# Patient Record
Sex: MALE | Race: WHITE | Employment: OTHER | URBAN - METROPOLITAN AREA
[De-identification: names, ages, dates, MRNs, and addresses within clinical notes are randomized per-mention and may not be internally consistent; named-entity substitution may affect disease eponyms.]

---

## 2019-09-27 PROBLEM — R09.81 NASAL CONGESTION: Status: ACTIVE | Noted: 2019-09-27

## 2020-09-23 ENCOUNTER — VIRTUAL VISIT (OUTPATIENT)
Dept: GERIATRIC MEDICINE | Age: 80
End: 2020-09-23
Payer: MEDICARE

## 2020-09-23 PROCEDURE — 99212 OFFICE O/P EST SF 10 MIN: CPT | Performed by: INTERNAL MEDICINE

## 2020-09-23 PROCEDURE — 4040F PNEUMOC VAC/ADMIN/RCVD: CPT | Performed by: INTERNAL MEDICINE

## 2020-09-23 PROCEDURE — 1036F TOBACCO NON-USER: CPT | Performed by: INTERNAL MEDICINE

## 2020-09-23 PROCEDURE — G8427 DOCREV CUR MEDS BY ELIG CLIN: HCPCS | Performed by: INTERNAL MEDICINE

## 2020-09-23 PROCEDURE — 1123F ACP DISCUSS/DSCN MKR DOCD: CPT | Performed by: INTERNAL MEDICINE

## 2020-09-23 PROCEDURE — G8417 CALC BMI ABV UP PARAM F/U: HCPCS | Performed by: INTERNAL MEDICINE

## 2020-09-23 RX ORDER — AMPICILLIN 500 MG/1
500 CAPSULE ORAL 4 TIMES DAILY
Qty: 40 CAPSULE | Refills: 0 | Status: SHIPPED | OUTPATIENT
Start: 2020-09-23 | End: 2020-10-03

## 2020-09-23 NOTE — PROGRESS NOTES
TeleMedicine Video Visit    This visit was performed as a virtual video visit using a synchronous, two-way, audio-video telehealth technology platform. Patient identification was verified at the start of the visit, including the patient's telephone number and physical location. I discussed with the patient the nature of our telehealth visits, that:     1. Due to the nature of an audio- video modality, the only components of a physical exam that could be done are the elements supported by direct observation. 2. I would evaluate the patient and recommend diagnostics and treatments based on my assessment. 3. If it was felt that the patient should be evaluated in clinic or an emergency room setting, then they would be directed there. 4. Our sessions are not being recorded and that personal health information is protected. 5. Our team would provide follow up care in person if/when the patient needs it. Patient does agree to proceed with telemedicine consultation. Patient's location: home address in [facility]  Physician  location home address in [facility] other people involved in call Sharlaveronica Marlene      Time spent: Greater than 20    This visit was completed virtually using Doxy. me    Video conference at 3PM    70year old alfonso with Trisomy 21 and Alzheimer's disease on Aricept 10 mg  and Namenda 20 mg a day  Out of workshop x 6 months  Will be playing [employer]  Very confused with eye infection  No COVID  No temp   Eating well  Lost interest in chicken new problem  Going back to [facility] twice a week  Still interest in [employer] manly type   Picking more his  skin   Spoke with [employer] [facility] with walker with unsteadiness  Needs handicap placard   Needs Ampicillin 500 mg tid prn   Kunal Shackle his niece  And he recognizes her  He meds and allergies were reviewed  Living in [place] on [facility]  Trisomy 21 with Alzheimers                       Delirium with infection   Plan  Same meds for now           Imelda Halsted the oldest person in the WORLD            With Trisomy 21  last year at 80years old. Remonia Clarity will be 68years old soon and  may be the present champion or runner up, tamar checked out.

## 2020-11-03 PROBLEM — I10 HYPERTENSION: Status: RESOLVED | Noted: 2020-11-03 | Resolved: 2020-11-03

## 2020-11-18 ENCOUNTER — TELEPHONE (OUTPATIENT)
Dept: GERIATRIC MEDICINE | Age: 80
End: 2020-11-18

## 2020-11-18 RX ORDER — DONEPEZIL HYDROCHLORIDE 5 MG/1
5 TABLET, FILM COATED ORAL
Qty: 90 TABLET | Refills: 3 | Status: SHIPPED
Start: 2020-11-18 | End: 2022-02-09

## 2020-11-18 NOTE — TELEPHONE ENCOUNTER
Pt caregiver called states that she thinks that pt meds need increased states that he is putting his clothes on backwards, very forgetful and states that he is going back to his old room.  Please call her

## 2020-11-18 NOTE — TELEPHONE ENCOUNTER
Spoke with caregiver and will increase Aricept to 15 mg a day and Namenda to remain at 10 mg a day  Escripted to [employer]

## 2021-04-28 ENCOUNTER — HOSPITAL ENCOUNTER (OUTPATIENT)
Dept: WOUND CARE | Age: 81
Discharge: HOME | End: 2021-04-28
Payer: MEDICARE

## 2021-04-28 VITALS
RESPIRATION RATE: 18 BRPM | DIASTOLIC BLOOD PRESSURE: 58 MMHG | TEMPERATURE: 99.1 F | HEIGHT: 61 IN | BODY MASS INDEX: 29.64 KG/M2 | SYSTOLIC BLOOD PRESSURE: 102 MMHG | WEIGHT: 157 LBS | HEART RATE: 88 BPM

## 2021-04-28 DIAGNOSIS — S31.104A NON-HEALING OPEN WOUND OF LEFT GROIN, INITIAL ENCOUNTER: Primary | ICD-10-CM

## 2021-04-28 PROCEDURE — 99203 OFFICE O/P NEW LOW 30 MIN: CPT | Performed by: SURGERY

## 2021-04-28 PROCEDURE — 11042 DBRDMT SUBQ TIS 1ST 20SQCM/<: CPT

## 2021-04-28 PROCEDURE — 11042 DBRDMT SUBQ TIS 1ST 20SQCM/<: CPT | Performed by: SURGERY

## 2021-04-28 PROCEDURE — 99213 OFFICE O/P EST LOW 20 MIN: CPT

## 2021-04-28 RX ORDER — LIDOCAINE 40 MG/G
CREAM TOPICAL ONCE
Status: CANCELLED | OUTPATIENT
Start: 2021-04-28 | End: 2021-04-28

## 2021-04-28 RX ORDER — GENTAMICIN SULFATE 1 MG/G
OINTMENT TOPICAL ONCE
Status: CANCELLED | OUTPATIENT
Start: 2021-04-28 | End: 2021-04-28

## 2021-04-28 RX ORDER — GINSENG 100 MG
CAPSULE ORAL ONCE
Status: CANCELLED | OUTPATIENT
Start: 2021-04-28 | End: 2021-04-28

## 2021-04-28 RX ORDER — BETAMETHASONE DIPROPIONATE 0.05 %
OINTMENT (GRAM) TOPICAL ONCE
Status: CANCELLED | OUTPATIENT
Start: 2021-04-28 | End: 2021-04-28

## 2021-04-28 RX ORDER — CLOBETASOL PROPIONATE 0.5 MG/G
OINTMENT TOPICAL ONCE
Status: CANCELLED | OUTPATIENT
Start: 2021-04-28 | End: 2021-04-28

## 2021-04-28 RX ORDER — LIDOCAINE HYDROCHLORIDE 20 MG/ML
JELLY TOPICAL ONCE
Status: CANCELLED | OUTPATIENT
Start: 2021-04-28 | End: 2021-04-28

## 2021-04-28 RX ORDER — BACITRACIN, NEOMYCIN, POLYMYXIN B 400; 3.5; 5 [USP'U]/G; MG/G; [USP'U]/G
OINTMENT TOPICAL ONCE
Status: CANCELLED | OUTPATIENT
Start: 2021-04-28 | End: 2021-04-28

## 2021-04-28 RX ORDER — BACITRACIN ZINC AND POLYMYXIN B SULFATE 500; 1000 [USP'U]/G; [USP'U]/G
OINTMENT TOPICAL ONCE
Status: CANCELLED | OUTPATIENT
Start: 2021-04-28 | End: 2021-04-28

## 2021-04-28 RX ORDER — LIDOCAINE HYDROCHLORIDE 40 MG/ML
SOLUTION TOPICAL ONCE
Status: CANCELLED | OUTPATIENT
Start: 2021-04-28 | End: 2021-04-28

## 2021-04-28 RX ORDER — LIDOCAINE 50 MG/G
OINTMENT TOPICAL ONCE
Status: CANCELLED | OUTPATIENT
Start: 2021-04-28 | End: 2021-04-28

## 2021-04-28 RX ORDER — DOXYCYCLINE HYCLATE 50 MG/1
50 CAPSULE ORAL 2 TIMES DAILY
COMMUNITY
End: 2021-06-16 | Stop reason: ALTCHOICE

## 2021-04-28 NOTE — PROGRESS NOTES
H&P / Consultation Note - [facility] / Olga Sellers    PCP : Jerson Kaiser MD  Podiatrist :     Reason for Consult: nonhealing open wound left groin    HISTORY OF PRESENT ILLNESS:    The patient is a 68 y.o. male who presents for evaluation and treatment at the [facility]. The patient has had a wound of left groin for 5 years. This has been treated conservatively at home. Past history significant for MR. The patient reports drainage, redness, pain and denies fever. The suspected etiology of the wound is non-healing/non-surgical.   The patient has noted that the wound has not been improving. Pt has 5/10 pain associated with the wound. Pt no hx of steroids. Pt is currently not on abx.       Vascular Studies no    ROS : All others Negative if blank [], Positive if [x]  General Urinary   [] Fevers [] Hematuria   [] Chills [] Dysuria   [] Weight Loss Vascular   Skin [] Claudication   [] Tissue Loss [] Rest Pain   Eyes Neurologic   [] Wears Glasses/Contacts [] Stroke/TIA   [] Vision Changes [] Focal weakness   Respiratory [] Slurred Speech    [] Shortness of breath ENT   Cardiovascular [] Difficulty swallowing   [] Chest Pain    [] Shortness of breath with exertion    Gastrointestinal    [] Abdominal Pain    [] Melena   [] Hematochezia         Past Medical History:   Diagnosis Date    [facility]) [facility] [address]     Hyperlipidemia     Hypertension     Hypothyroidism     Osteoporosis      Past Surgical History:   Procedure Laterality Date    CHOLECYSTECTOMY       Current Medications:     Current Outpatient Medications:     doxycycline (VIBRAMYCIN) 50 MG capsule, Take 50 mg by mouth 2 times daily, Disp: , Rfl:     donepezil (ARICEPT) 5 MG tablet, Take 1 tablet by mouth daily (with breakfast), Disp: 90 tablet, Rfl: 3    aspirin 81 MG chewable tablet, TAKE ONE TABLET BY MOUTH ONCE DAILY AT 7 PM, Disp: 31 tablet, Rfl: 0    Calcium Carbonate-Vitamin D (OYSTER SHELL CALCIUM/D) 500-200 MG-UNIT TABS, TAKE 1 TABLET BY MOUTH TWICE A DAY, Disp: 62 tablet, Rfl: 0    cetirizine (ZYRTEC) 10 MG tablet, TAKE 1 TABLET BY MOUTH DAILY, Disp: 31 tablet, Rfl: 0    vitamin D (CHOLECALCIFEROL) 50 MCG (2000 UT) TABS tablet, TAKE 1 TABLET BY MOUTH DAILY, Disp: 31 tablet, Rfl: 0    melatonin (GNP MELATONIN) 3 MG TABS tablet, TAKE 1 TABLET BY MOUTH AT  BEDTIME., Disp: 31 tablet, Rfl: 0    donepezil (ARICEPT) 10 MG tablet, TAKE 1 TABLET BY MOUTH NIGHTLY., Disp: 31 tablet, Rfl: 0    memantine (NAMENDA) 10 MG tablet, TAKE 1 TABLET BY MOUTH TWICE A DAY, Disp: 62 tablet, Rfl: 0    levothyroxine (SYNTHROID) 88 MCG tablet, TAKE 1 TABLET BY MOUTH IN  THE MORNING., Disp: 31 tablet, Rfl: 0    vitamin B-12 (CYANOCOBALAMIN) 1000 MCG tablet, TAKE 1 TABLET BY MOUTH DAILY, Disp: 31 tablet, Rfl: 0    Disposable Gloves (LATEX GLOVES) MISC, UAD, Disp: 200 each, Rfl: 11    Cholecalciferol (VITAMIN D) 50 MCG (2000 UT) CAPS capsule, Take 2,000 Units by mouth daily, Disp: 30 capsule, Rfl: 0    cholestyramine (QUESTRAN) 4 GM/DOSE powder, Take 1 packet by mouth daily as needed (diarrhea), Disp: 60 packet, Rfl: 2    clotrimazole-betamethasone (LOTRISONE) 1-0.05 % cream, Apply topically 2 times daily aplly up to twice a day as needed for inguinal fods for red rash and itching, Disp: 1 Tube, Rfl: 1    erythromycin (ROMYCIN) 5 MG/GM ophthalmic ointment, Apply to affected eye(s) nightly as needed. for red eyelids, Disp: 1 Tube, Rfl: 2    fluticasone (FLONASE) 50 MCG/ACT nasal spray, 2 sprays by Each Nostril route daily, Disp: 1 Bottle, Rfl: 3    HYDROCORTISONE, TOPICAL, (SCALPICIN MAXIMUM STRENGTH) 1 % SOLN, Scalp relief liquid use topically three time weekly on Monday, Wednesday, Friday as needed. , Disp: 71 mL, Rfl: 5    Incontinence Supply Disposable (DISPOS UNDERPADS/45G/23\"X36\") MISC, Use daily as directed, Disp: 100 each, Rfl: 11    ketoconazole (NIZORAL) 2 % shampoo, Apply topically three times a week Apply topically daily mouth every 6 hours as needed for Pain, Disp: , Rfl:     Neomycin-Bacitracin-Polymyxin (NEOSPORIN EX), Apply topically Twice a day as needed for treatment of cuts & scrapes. , Disp: , Rfl:     Syringe/Needle, Disp, (SYRINGE 3CC/25GX5/8\") 25G X 5/8\" 3 ML MISC, 1 every month, Disp: 10 each, Rfl: 5  Allergies:  Cat hair extract and Dust mite extract  Social History     Socioeconomic History    Marital status: Single     Spouse name: Not on file    Number of children: Not on file    Years of education: Not on file    Highest education level: Not on file   Occupational History    Not on file   Social Needs    Financial resource strain: Not on file    Food insecurity     Worry: Not on file     Inability: Not on file    Transportation needs     Medical: Not on file     Non-medical: Not on file   Tobacco Use    Smoking status: Never Smoker    Smokeless tobacco: Never Used   Substance and Sexual Activity    Alcohol use: No    Drug use: Not on file    Sexual activity: Not on file   Lifestyle    Physical activity     Days per week: Not on file     Minutes per session: Not on file    Stress: Not on file   Relationships    Social connections     Talks on phone: Not on file     Gets together: Not on file     Attends Caodaism service: Not on file     Active member of club or organization: Not on file     Attends meetings of clubs or organizations: Not on file     Relationship status: Not on file    Intimate partner violence     Fear of current or ex partner: Not on file     Emotionally abused: Not on file     Physically abused: Not on file     Forced sexual activity: Not on file   Other Topics Concern    Not on file   Social History Narrative    Not on file     History reviewed. No pertinent family history.     Objective:    BP (!) 102/58   Pulse 88   Temp 99.1 °F (37.3 °C) (Temporal)   Resp 18   Ht 5' 1\" (1.549 m)   Wt 157 lb (71.2 kg)   BMI 29.66 kg/m²   Wound Evaluation  - Undermining is not present  - hyperkeratotic tissue, including a layer of surrounding healthy tissue. Devitalized Tissue Debrided:  slough, necrotic/eschar and exudatee. Percent of Wound Debrided: 100%  Total Surface Area Debrided:   < 20 sq cm  Bleeding: Minimal  Hemostasis:   not needed  Response to treatment:  Well tolerated by patient. Plan:   Plan for wound - Dress per physician order  Treatment:     Compression :    Dressing : alginate   Additional :      1. Labs ordered No  2. Cultures done No  3. Vascular Studies ordered No  4. Pt is not a smoker   - gamaliel  5. Discussed appropriate home care of this wound. , Dispensed dressing supplies and instructions on their use., Wound redressed. 6. Patient instructions were given. 7. Follow up: 1 week.     Dewayne Vogel MD

## 2021-05-05 ENCOUNTER — HOSPITAL ENCOUNTER (OUTPATIENT)
Dept: WOUND CARE | Age: 81
Discharge: HOME | End: 2021-05-05
Payer: MEDICARE

## 2021-05-05 VITALS
SYSTOLIC BLOOD PRESSURE: 122 MMHG | BODY MASS INDEX: 29.64 KG/M2 | DIASTOLIC BLOOD PRESSURE: 72 MMHG | HEIGHT: 61 IN | TEMPERATURE: 97.1 F | HEART RATE: 68 BPM | WEIGHT: 157 LBS | RESPIRATION RATE: 18 BRPM

## 2021-05-05 DIAGNOSIS — S31.104A NON-HEALING OPEN WOUND OF LEFT GROIN, INITIAL ENCOUNTER: Primary | ICD-10-CM

## 2021-05-05 PROCEDURE — 11042 DBRDMT SUBQ TIS 1ST 20SQCM/<: CPT | Performed by: SURGERY

## 2021-05-05 PROCEDURE — 11042 DBRDMT SUBQ TIS 1ST 20SQCM/<: CPT

## 2021-05-05 PROCEDURE — 6370000000 HC RX 637 (ALT 250 FOR IP): Performed by: SURGERY

## 2021-05-05 RX ORDER — GINSENG 100 MG
CAPSULE ORAL ONCE
Status: CANCELLED | OUTPATIENT
Start: 2021-05-05 | End: 2021-05-05

## 2021-05-05 RX ORDER — LIDOCAINE HYDROCHLORIDE 20 MG/ML
JELLY TOPICAL ONCE
Status: CANCELLED | OUTPATIENT
Start: 2021-05-05 | End: 2021-05-05

## 2021-05-05 RX ORDER — BACITRACIN, NEOMYCIN, POLYMYXIN B 400; 3.5; 5 [USP'U]/G; MG/G; [USP'U]/G
OINTMENT TOPICAL ONCE
Status: CANCELLED | OUTPATIENT
Start: 2021-05-05 | End: 2021-05-05

## 2021-05-05 RX ORDER — GENTAMICIN SULFATE 1 MG/G
OINTMENT TOPICAL ONCE
Status: CANCELLED | OUTPATIENT
Start: 2021-05-05 | End: 2021-05-05

## 2021-05-05 RX ORDER — BACITRACIN ZINC AND POLYMYXIN B SULFATE 500; 1000 [USP'U]/G; [USP'U]/G
OINTMENT TOPICAL ONCE
Status: CANCELLED | OUTPATIENT
Start: 2021-05-05 | End: 2021-05-05

## 2021-05-05 RX ORDER — CLOBETASOL PROPIONATE 0.5 MG/G
OINTMENT TOPICAL ONCE
Status: CANCELLED | OUTPATIENT
Start: 2021-05-05 | End: 2021-05-05

## 2021-05-05 RX ORDER — BETAMETHASONE DIPROPIONATE 0.05 %
OINTMENT (GRAM) TOPICAL ONCE
Status: CANCELLED | OUTPATIENT
Start: 2021-05-05 | End: 2021-05-05

## 2021-05-05 RX ORDER — LIDOCAINE HYDROCHLORIDE 40 MG/ML
SOLUTION TOPICAL ONCE
Status: COMPLETED | OUTPATIENT
Start: 2021-05-05 | End: 2021-05-05

## 2021-05-05 RX ORDER — LIDOCAINE HYDROCHLORIDE 40 MG/ML
SOLUTION TOPICAL ONCE
Status: CANCELLED | OUTPATIENT
Start: 2021-05-05 | End: 2021-05-05

## 2021-05-05 RX ORDER — LIDOCAINE 50 MG/G
OINTMENT TOPICAL ONCE
Status: CANCELLED | OUTPATIENT
Start: 2021-05-05 | End: 2021-05-05

## 2021-05-05 RX ORDER — LIDOCAINE 40 MG/G
CREAM TOPICAL ONCE
Status: CANCELLED | OUTPATIENT
Start: 2021-05-05 | End: 2021-05-05

## 2021-05-05 RX ADMIN — LIDOCAINE HYDROCHLORIDE 10 ML: 40 SOLUTION TOPICAL at 14:58

## 2021-05-05 NOTE — PROGRESS NOTES
Hyperlipidemia E78.5    Osteoporosis M81.0    Hypothyroidism E03.9    AD (Alzheimer's disease) ([facility] [address] G30.9, F02.80    Rash R21    Gait instability R26.81    Nasal congestion R09.81    Non-healing left groin open wound S31.104A       Overall Wound Assessment  Wound has improved. Size has decreased. Appearance has improved. (Please refer to nursing measurements and assessment regarding wound measurements.) Wound check - Care provided includes removal of existing dressing and visual inspection. Plan:       1. 100%Debridement today. See Procedure Note below. 2. Discussed appropriate home care of this wound. Dispensed dressing supplies and instructions on their use. Wound redressed. 3. Patient instructions were given. Dressing: alginate Offloading. 4. Follow up: 1 week. Gilmer Mcnally. Main Procedure Note    Wendy Germain  AGE: 68 y.o. GENDER: male  : 1940    TODAY'S DATE:  2021    The patient was identified and the procedure was confirmed. Lidocaine gel soaked gauze was applied at beginning of wound evaluation. The left groin wound was excisionally debrided sharply of fibrotic, necrotic, and hyperkeratotic tissue, including a layer of surrounding healthy tissue using curette for a total surface area of < 20 cm2. The wound(s) was debrided down through and including subcutaneous tissue. 100% of the wound was debrided. There was minimal bleeding that was controlled with pressure. Patient tolerated procedure well and was given proper instruction.       Tracie Decker MD

## 2021-05-26 ENCOUNTER — HOSPITAL ENCOUNTER (OUTPATIENT)
Dept: WOUND CARE | Age: 81
Discharge: HOME | End: 2021-05-26
Payer: MEDICARE

## 2021-05-26 VITALS
DIASTOLIC BLOOD PRESSURE: 54 MMHG | HEART RATE: 64 BPM | TEMPERATURE: 97 F | RESPIRATION RATE: 18 BRPM | BODY MASS INDEX: 29.64 KG/M2 | SYSTOLIC BLOOD PRESSURE: 110 MMHG | WEIGHT: 157 LBS | HEIGHT: 61 IN

## 2021-05-26 DIAGNOSIS — S31.104A NON-HEALING OPEN WOUND OF LEFT GROIN, INITIAL ENCOUNTER: Primary | ICD-10-CM

## 2021-05-26 PROCEDURE — 6370000000 HC RX 637 (ALT 250 FOR IP): Performed by: SURGERY

## 2021-05-26 PROCEDURE — 11042 DBRDMT SUBQ TIS 1ST 20SQCM/<: CPT | Performed by: SURGERY

## 2021-05-26 PROCEDURE — 11042 DBRDMT SUBQ TIS 1ST 20SQCM/<: CPT

## 2021-05-26 RX ORDER — TRIAMCINOLONE ACETONIDE 0.25 MG/G
OINTMENT TOPICAL
Qty: 1 TUBE | Refills: 1 | Status: SHIPPED | OUTPATIENT
Start: 2021-05-26 | End: 2021-06-02

## 2021-05-26 RX ORDER — BACITRACIN ZINC AND POLYMYXIN B SULFATE 500; 1000 [USP'U]/G; [USP'U]/G
OINTMENT TOPICAL ONCE
Status: CANCELLED | OUTPATIENT
Start: 2021-05-26 | End: 2021-05-26

## 2021-05-26 RX ORDER — CLOBETASOL PROPIONATE 0.5 MG/G
OINTMENT TOPICAL ONCE
Status: CANCELLED | OUTPATIENT
Start: 2021-05-26 | End: 2021-05-26

## 2021-05-26 RX ORDER — LIDOCAINE 40 MG/G
CREAM TOPICAL ONCE
Status: CANCELLED | OUTPATIENT
Start: 2021-05-26 | End: 2021-05-26

## 2021-05-26 RX ORDER — LIDOCAINE 50 MG/G
OINTMENT TOPICAL ONCE
Status: CANCELLED | OUTPATIENT
Start: 2021-05-26 | End: 2021-05-26

## 2021-05-26 RX ORDER — GINSENG 100 MG
CAPSULE ORAL ONCE
Status: CANCELLED | OUTPATIENT
Start: 2021-05-26 | End: 2021-05-26

## 2021-05-26 RX ORDER — LIDOCAINE HYDROCHLORIDE 20 MG/ML
JELLY TOPICAL ONCE
Status: CANCELLED | OUTPATIENT
Start: 2021-05-26 | End: 2021-05-26

## 2021-05-26 RX ORDER — LIDOCAINE HYDROCHLORIDE 40 MG/ML
SOLUTION TOPICAL ONCE
Status: COMPLETED | OUTPATIENT
Start: 2021-05-26 | End: 2021-05-26

## 2021-05-26 RX ORDER — GENTAMICIN SULFATE 1 MG/G
OINTMENT TOPICAL ONCE
Status: CANCELLED | OUTPATIENT
Start: 2021-05-26 | End: 2021-05-26

## 2021-05-26 RX ORDER — BACITRACIN, NEOMYCIN, POLYMYXIN B 400; 3.5; 5 [USP'U]/G; MG/G; [USP'U]/G
OINTMENT TOPICAL ONCE
Status: CANCELLED | OUTPATIENT
Start: 2021-05-26 | End: 2021-05-26

## 2021-05-26 RX ORDER — TRIAMCINOLONE ACETONIDE 1 MG/G
CREAM TOPICAL DAILY
COMMUNITY

## 2021-05-26 RX ORDER — BETAMETHASONE DIPROPIONATE 0.05 %
OINTMENT (GRAM) TOPICAL ONCE
Status: CANCELLED | OUTPATIENT
Start: 2021-05-26 | End: 2021-05-26

## 2021-05-26 RX ORDER — LIDOCAINE HYDROCHLORIDE 40 MG/ML
SOLUTION TOPICAL ONCE
Status: CANCELLED | OUTPATIENT
Start: 2021-05-26 | End: 2021-05-26

## 2021-05-26 RX ADMIN — LIDOCAINE HYDROCHLORIDE 5 ML: 40 SOLUTION TOPICAL at 14:39

## 2021-05-26 NOTE — PROGRESS NOTES
Wound Care Center Follow-Up Progress Note    Dom Armstrong  AGE: 68 y.o. GENDER: male  : 1940    TODAY'S DATE:  2021    Subjective:    Dom Armstrong is a 68 y.o. male who presents today for follow-up examination and treatment of a delayed-healing wound(s). The patient denies any problems since last visit. Objective:    BP (!) 110/54   Pulse 64   Temp 97 °F (36.1 °C) (Temporal)   Resp 18   Ht 5' 1\" (1.549 m)   Wt 157 lb (71.2 kg)   BMI 29.66 kg/m²     (Wound Reference Date is when first assessed.   Measurements shown are from today's visit.)    Wound 21 Groin Left #1 (Active)   Wound Image   21 1431   Wound Etiology Other 21 1431   Dressing Status New dressing applied 21 1543   Wound Cleansed Cleansed with saline 21 1543   Dressing/Treatment Alginate;Dry dressing 21 1543   Offloading for Diabetic Foot Ulcers No offloading required 21 1543   Wound Length (cm) 3 cm 21 1436   Wound Width (cm) 0.5 cm 21 1436   Wound Depth (cm) 0.1 cm 21 1436   Wound Surface Area (cm^2) 1.5 cm^2 21 1436   Change in Wound Size % (l*w) 87.5 21 1436   Wound Volume (cm^3) 0.15 cm^3 21 1436   Wound Healing % 97 21 1436   Post-Procedure Length (cm) 6 cm 21 1522   Post-Procedure Width (cm) 1 cm 21 1522   Post-Procedure Depth (cm) 0.3 cm 21 1522   Post-Procedure Surface Area (cm^2) 6 cm^2 21 1522   Post-Procedure Volume (cm^3) 1.8 cm^3 21 1522   Wound Assessment Granulation tissue 21 1436   Drainage Amount Moderate 21 1436   Drainage Description Serosanguinous 21 1436   Odor None 21 1436   Lisa-wound Assessment Excoriated 21 1436   Number of days: 28        Other physical exam findings:        Assessment:     Patient Active Problem List   Diagnosis Code    [facility] onset [facility] G30.0, F02.80    Trisomy 21 Q90.9    HTN, goal below 140/90 I10    Hyperlipidemia E78.5    Osteoporosis M81.0    Hypothyroidism E03.9    AD (Alzheimer's disease) ([facility] [address] G30.9, F02.80    Rash R21    Gait instability R26.81    Nasal congestion R09.81    Non-healing left groin open wound S31.104A       Overall Wound Assessment  Wound has improved. Size has decreased. Appearance has improved. Increased skin rash around wound     (Please refer to nursing measurements and assessment regarding wound measurements.) Wound check - Care provided includes removal of existing dressing and visual inspection. Plan:       1. 100%Debridement today. See Procedure Note below. 2. Discussed appropriate home care of this wound. Dispensed dressing supplies and instructions on their use. Wound redressed. 3. Patient instructions were given. Dressing: collagen/triamcinolone for surrounding skin Offloading. 4. Follow up: 1 week. Gilmer Rene. Main Procedure Note    Kallie Mancini  AGE: 68 y.o. GENDER: male  : 1940    TODAY'S DATE:  2021    The patient was identified and the procedure was confirmed. Lidocaine gel soaked gauze was applied at beginning of wound evaluation. The left groin wound was excisionally debrided sharply of fibrotic, necrotic, and hyperkeratotic tissue, including a layer of surrounding healthy tissue using curette for a total surface area of < 20 cm2. The wound(s) was debrided down through and including full thickness tissue. 100% of the wound was debrided. There was minimal bleeding that was controlled with pressure. Patient tolerated procedure well and was given proper instruction.       Tadeo Espinoza MD

## 2021-06-16 ENCOUNTER — HOSPITAL ENCOUNTER (OUTPATIENT)
Dept: WOUND CARE | Age: 81
Discharge: HOME | End: 2021-06-16
Payer: MEDICARE

## 2021-06-16 VITALS — TEMPERATURE: 98.5 F | SYSTOLIC BLOOD PRESSURE: 100 MMHG | RESPIRATION RATE: 18 BRPM | DIASTOLIC BLOOD PRESSURE: 54 MMHG

## 2021-06-16 DIAGNOSIS — S31.104A NON-HEALING OPEN WOUND OF LEFT GROIN, INITIAL ENCOUNTER: Primary | ICD-10-CM

## 2021-06-16 DIAGNOSIS — S41.101A OPEN ARM WOUND, RIGHT, INITIAL ENCOUNTER: ICD-10-CM

## 2021-06-16 PROCEDURE — 6370000000 HC RX 637 (ALT 250 FOR IP): Performed by: SURGERY

## 2021-06-16 PROCEDURE — 11042 DBRDMT SUBQ TIS 1ST 20SQCM/<: CPT

## 2021-06-16 PROCEDURE — 99212 OFFICE O/P EST SF 10 MIN: CPT | Performed by: SURGERY

## 2021-06-16 PROCEDURE — 11042 DBRDMT SUBQ TIS 1ST 20SQCM/<: CPT | Performed by: SURGERY

## 2021-06-16 RX ORDER — LIDOCAINE HYDROCHLORIDE 40 MG/ML
SOLUTION TOPICAL ONCE
Status: COMPLETED | OUTPATIENT
Start: 2021-06-16 | End: 2021-06-16

## 2021-06-16 RX ORDER — CLOBETASOL PROPIONATE 0.5 MG/G
OINTMENT TOPICAL ONCE
Status: CANCELLED | OUTPATIENT
Start: 2021-06-16 | End: 2021-06-16

## 2021-06-16 RX ORDER — LIDOCAINE 40 MG/G
CREAM TOPICAL ONCE
Status: CANCELLED | OUTPATIENT
Start: 2021-06-16 | End: 2021-06-16

## 2021-06-16 RX ORDER — LIDOCAINE HYDROCHLORIDE 20 MG/ML
JELLY TOPICAL ONCE
Status: CANCELLED | OUTPATIENT
Start: 2021-06-16 | End: 2021-06-16

## 2021-06-16 RX ORDER — GINSENG 100 MG
CAPSULE ORAL ONCE
Status: CANCELLED | OUTPATIENT
Start: 2021-06-16 | End: 2021-06-16

## 2021-06-16 RX ORDER — LIDOCAINE HYDROCHLORIDE 40 MG/ML
SOLUTION TOPICAL ONCE
Status: CANCELLED | OUTPATIENT
Start: 2021-06-16 | End: 2021-06-16

## 2021-06-16 RX ORDER — LIDOCAINE 50 MG/G
OINTMENT TOPICAL ONCE
Status: CANCELLED | OUTPATIENT
Start: 2021-06-16 | End: 2021-06-16

## 2021-06-16 RX ORDER — BACITRACIN, NEOMYCIN, POLYMYXIN B 400; 3.5; 5 [USP'U]/G; MG/G; [USP'U]/G
OINTMENT TOPICAL ONCE
Status: CANCELLED | OUTPATIENT
Start: 2021-06-16 | End: 2021-06-16

## 2021-06-16 RX ORDER — BACITRACIN ZINC AND POLYMYXIN B SULFATE 500; 1000 [USP'U]/G; [USP'U]/G
OINTMENT TOPICAL ONCE
Status: CANCELLED | OUTPATIENT
Start: 2021-06-16 | End: 2021-06-16

## 2021-06-16 RX ORDER — GENTAMICIN SULFATE 1 MG/G
OINTMENT TOPICAL ONCE
Status: CANCELLED | OUTPATIENT
Start: 2021-06-16 | End: 2021-06-16

## 2021-06-16 RX ORDER — BETAMETHASONE DIPROPIONATE 0.05 %
OINTMENT (GRAM) TOPICAL ONCE
Status: CANCELLED | OUTPATIENT
Start: 2021-06-16 | End: 2021-06-16

## 2021-06-16 RX ADMIN — LIDOCAINE HYDROCHLORIDE: 40 SOLUTION TOPICAL at 14:01

## 2021-06-16 NOTE — PROGRESS NOTES
Wound Care Center Follow-Up Progress Note    Juanita Esquivel  AGE: 68 y.o. GENDER: male  : 1940    TODAY'S DATE:  2021    Subjective:    Juanita Esquivel is a 68 y.o. male who presents today for follow-up examination and treatment of a delayed-healing wound(s). The patient developed an open wound right forearm since last visit since last visit.scraped arm on chair      Objective:    BP (!) 100/54   Temp 98.5 °F (36.9 °C) (Temporal)   Resp 18     (Wound Reference Date is when first assessed. Measurements shown are from today's visit.)    Wound 21 Groin Left #1 (Active)   Wound Image   21 1350   Wound Etiology Other 21 1431   Dressing Status New dressing applied 21 1543   Wound Cleansed Cleansed with saline 21 1543   Dressing/Treatment Collagen;Gauze dressing/dressing sponge 21 1509   Offloading for Diabetic Foot Ulcers No offloading required 21 1543   Wound Length (cm) 1.3 cm 21 1350   Wound Width (cm) 0.2 cm 21 1350   Wound Depth (cm) 0.1 cm 21 1350   Wound Surface Area (cm^2) 0.26 cm^2 21 135   Change in Wound Size % (l*w) 97.83 21 135   Wound Volume (cm^3) 0.03 cm^3 21 1350   Wound Healing % 99 21 1350   Post-Procedure Length (cm) 1.3 cm 21 1409   Post-Procedure Width (cm) 0.2 cm 21 1409   Post-Procedure Depth (cm) 0.2 cm 21 1409   Post-Procedure Surface Area (cm^2) 0.26 cm^2 21 1409   Post-Procedure Volume (cm^3) 0.05 cm^3 21 1409   Wound Assessment Pink/red 21 1350   Drainage Amount Scant 21 1350   Drainage Description Serosanguinous 21 1350   Odor None 21 1350   Lisa-wound Assessment Excoriated 21 1350   Number of days: 48       Wound 21 Arm Right; Lower #2 (Active)   Wound Image   21 1350   Wound Length (cm) 2.9 cm 21 1350   Wound Width (cm) 0.5 cm 21 1350   Wound Depth (cm) 0.1 cm 21 1350   Wound Surface Area (cm^2) using curette for a total surface area of < 20 cm2. The wound(s) was debrided down through and including full thickness tissue. 100% of the wound was debrided. There was minimal bleeding that was controlled with pressure. Patient tolerated procedure well and was given proper instruction.       Cesar Gaston MD

## 2021-06-16 NOTE — PROGRESS NOTES
Days    Electronically signed by Cuca Rodas RN on 6/16/2021 at 3:46 PM     Provider Information:      PROVIDER'S NAME: Marilyn Streeter    [facility]:[id number]

## 2021-06-26 ENCOUNTER — HOSPITAL ENCOUNTER (EMERGENCY)
Age: 81
Discharge: HOME | End: 2021-06-26
Attending: EMERGENCY MEDICINE
Payer: MEDICARE

## 2021-06-26 ENCOUNTER — APPOINTMENT (OUTPATIENT)
Dept: CT IMAGING | Age: 81
End: 2021-06-26
Payer: MEDICARE

## 2021-06-26 VITALS
SYSTOLIC BLOOD PRESSURE: 112 MMHG | OXYGEN SATURATION: 98 % | TEMPERATURE: 97.3 F | WEIGHT: 150 LBS | RESPIRATION RATE: 14 BRPM | BODY MASS INDEX: 29.45 KG/M2 | DIASTOLIC BLOOD PRESSURE: 77 MMHG | HEIGHT: 60 IN | HEART RATE: 84 BPM

## 2021-06-26 DIAGNOSIS — S09.90XA CLOSED HEAD INJURY, INITIAL ENCOUNTER: Primary | ICD-10-CM

## 2021-06-26 DIAGNOSIS — T14.8XXA ABRASION: ICD-10-CM

## 2021-06-26 PROCEDURE — 99284 EMERGENCY DEPT VISIT MOD MDM: CPT

## 2021-06-26 PROCEDURE — 72125 CT NECK SPINE W/O DYE: CPT

## 2021-06-26 PROCEDURE — 70450 CT HEAD/BRAIN W/O DYE: CPT

## 2021-06-26 RX ORDER — DIAPER,BRIEF,INFANT-TODD,DISP
EACH MISCELLANEOUS
Status: DISCONTINUED
Start: 2021-06-26 | End: 2021-06-26 | Stop reason: HOSPADM

## 2021-06-28 NOTE — ED PROVIDER NOTES
Chief complaint: Fall and head injury      HPI:  6/28/21, Time: 6:12 AM EDT    HPI              Elio Dolan is a 68 y.o. male presenting to the ED for fall and head injury. History is obtained from the patient as well as caregivers. The patient had a mechanical fall in the bathtub. He did strike his head. He did not lose consciousness. He is not on any blood thinners. The patient does have dementia secondary to Alzheimer's disease. Per the caregivers it is the policy of the boss that he is to come to the emergency department for evaluation. They also state that he was holding his left shoulder. They state that this is chronic for him and he does have arthritis. The patient is acting like his normal self. History is limited secondary to the patient's history of dementia. ROS:   Review of Systems   Unable to perform ROS: Dementia       --------------------------------------------- PAST HISTORY ---------------------------------------------  Past Medical History:  has a past medical history of AD (Alzheimer's disease) ([facility] [address] Hyperlipidemia, Hypertension, Hypothyroidism, and Osteoporosis. Past Surgical History:  has a past surgical history that includes Cholecystectomy. Social History:  reports that he has never smoked. He has never used smokeless tobacco. He reports that he does not drink alcohol. Family History: family history is not on file. The patients home medications have been reviewed. Allergies: Cat hair extract and Dust mite extract    ---------------------------------------------------PHYSICAL EXAM--------------------------------------    Constitutional/General: Alert patient mostly nonverbal well appearing, non toxic in NAD  Head: Normocephalic, soft tissue hematoma in the posterior scalp  Mouth: Oropharynx clear, handling secretions, no trismus  Neck: Supple, full ROM,  Pulmonary: Lungs clear to auscultation bilaterally, no wheezes, rales, or rhonchi.  Not in respiratory distress  Cardiovascular:  Regular rate. Regular rhythm. No murmurs  Chest: no chest wall tenderness  Abdomen: Soft. Non tender. Non distended. No rebound, guarding, or rigidity. No pulsatile masses appreciated. Musculoskeletal: Moves all extremities x 4. Warm and well perfused, no clubbing, cyanosis, or edema. Capillary refill <3 seconds  Skin: warm and dry. No rashes. Neurologic: Alert, cranial nerves II-12 grossly intact, there is 5 out of 5 muscle strength of the bilateral upper and lower extremities  Psych: Normal Affect    -------------------------------------------------- RESULTS -------------------------------------------------  I have personally reviewed all laboratory and imaging results for this patient. Results are listed below. LABS:  No results found for this visit on 06/26/21. RADIOLOGY:  Interpreted by Radiologist.  [address] [place] [address]   Final Result   No acute intracranial abnormality. Prominent age-related loss of brain   volume and chronic periventricular ischemic changes. Evaluation significantly limited by patient positioning and rotation. CT CERVICAL SPINE WO CONTRAST   Final Result   No acute cervical spine fracture. Prominent degenerative changes of the cervical spine. Evaluation limited by patient positioning and rotation.                     ------------------------- NURSING NOTES AND VITALS REVIEWED ---------------------------   The nursing notes within the ED encounter and vital signs as below have been reviewed by myself. /77   Pulse 84   Temp 97.3 °F (36.3 °C) (Oral)   Resp 14   Ht 5' (1.524 m)   Wt 150 lb (68 kg)   SpO2 98%   BMI 29.29 kg/m²   Oxygen Saturation Interpretation: Normal    The patients available past medical records and past encounters were reviewed.         ------------------------------ ED COURSE/MEDICAL DECISION MAKING----------------------  Medications - No data to display          Medical Decision Making:   Dr. MICHELLE

## 2021-07-07 ENCOUNTER — HOSPITAL ENCOUNTER (OUTPATIENT)
Dept: WOUND CARE | Age: 81
Discharge: HOME | End: 2021-07-07
Payer: MEDICARE

## 2021-07-14 ENCOUNTER — HOSPITAL ENCOUNTER (OUTPATIENT)
Dept: WOUND CARE | Age: 81
Discharge: HOME | End: 2021-07-14
Payer: MEDICARE

## 2021-07-21 ENCOUNTER — HOSPITAL ENCOUNTER (OUTPATIENT)
Dept: WOUND CARE | Age: 81
Discharge: HOME | End: 2021-07-21
Payer: MEDICARE

## 2021-08-04 ENCOUNTER — HOSPITAL ENCOUNTER (OUTPATIENT)
Dept: WOUND CARE | Age: 81
Discharge: HOME | End: 2021-08-04
Payer: MEDICARE

## 2021-08-13 ENCOUNTER — VIRTUAL VISIT (OUTPATIENT)
Dept: GERIATRIC MEDICINE | Age: 81
End: 2021-08-13
Payer: MEDICARE

## 2021-08-13 DIAGNOSIS — F02.80 ALZHEIMER DISEASE (HCC): ICD-10-CM

## 2021-08-13 DIAGNOSIS — G30.9 ALZHEIMER DISEASE (HCC): ICD-10-CM

## 2021-08-13 DIAGNOSIS — G30.9 ALZHEIMER'S DEMENTIA WITHOUT BEHAVIORAL DISTURBANCE, UNSPECIFIED TIMING OF DEMENTIA ONSET: ICD-10-CM

## 2021-08-13 DIAGNOSIS — F02.80 ALZHEIMER'S DEMENTIA WITHOUT BEHAVIORAL DISTURBANCE, UNSPECIFIED TIMING OF DEMENTIA ONSET: ICD-10-CM

## 2021-08-13 DIAGNOSIS — J00 ACUTE NASOPHARYNGITIS: Primary | ICD-10-CM

## 2021-08-13 PROCEDURE — 1036F TOBACCO NON-USER: CPT | Performed by: INTERNAL MEDICINE

## 2021-08-13 PROCEDURE — G8417 CALC BMI ABV UP PARAM F/U: HCPCS | Performed by: INTERNAL MEDICINE

## 2021-08-13 PROCEDURE — 4040F PNEUMOC VAC/ADMIN/RCVD: CPT | Performed by: INTERNAL MEDICINE

## 2021-08-13 PROCEDURE — 1123F ACP DISCUSS/DSCN MKR DOCD: CPT | Performed by: INTERNAL MEDICINE

## 2021-08-13 PROCEDURE — G8427 DOCREV CUR MEDS BY ELIG CLIN: HCPCS | Performed by: INTERNAL MEDICINE

## 2021-08-13 PROCEDURE — 99212 OFFICE O/P EST SF 10 MIN: CPT | Performed by: INTERNAL MEDICINE

## 2021-08-13 RX ORDER — AZITHROMYCIN 250 MG/1
250 TABLET, FILM COATED ORAL SEE ADMIN INSTRUCTIONS
Qty: 6 TABLET | Refills: 0 | Status: SHIPPED | OUTPATIENT
Start: 2021-08-13 | End: 2021-08-18

## 2021-08-13 SDOH — ECONOMIC STABILITY: FOOD INSECURITY: WITHIN THE PAST 12 MONTHS, THE FOOD YOU BOUGHT JUST DIDN'T LAST AND YOU DIDN'T HAVE MONEY TO GET MORE.: NEVER TRUE

## 2021-08-13 SDOH — ECONOMIC STABILITY: FOOD INSECURITY: WITHIN THE PAST 12 MONTHS, YOU WORRIED THAT YOUR FOOD WOULD RUN OUT BEFORE YOU GOT MONEY TO BUY MORE.: NEVER TRUE

## 2021-08-13 ASSESSMENT — SOCIAL DETERMINANTS OF HEALTH (SDOH): HOW HARD IS IT FOR YOU TO PAY FOR THE VERY BASICS LIKE FOOD, HOUSING, MEDICAL CARE, AND HEATING?: NOT HARD AT ALL

## 2021-08-13 NOTE — PROGRESS NOTES
TeleMedicine Video Visit    Harvinder Cyr, was evaluated through a synchronous (real-time) audio-video encounter. The patient (or guardian if applicable) is aware that this is a billable service. Verbal consent to proceed has been obtained within the past 12 months. The visit was conducted pursuant to the emergency declaration under the [facility], [address] [place] [address] and the [employer] and [employer] General Act. Patient identification was verified, and a caregiver was present when appropriate. The patient was located in a state where the provider was credentialed to provide care. Patient identification was verified at the start of the visit, including the patient's telephone number and physical location. I discussed with the patient the nature of our telehealth visits, that:     1. Due to the nature of an audio- video modality, the only components of a physical exam that could be done are the elements supported by direct observation. 2. I would evaluate the patient and recommend diagnostics and treatments based on my assessment. 3. If it was felt that the patient should be evaluated in clinic or an emergency room setting, then they would be directed there. 4. Our sessions are not being recorded and that personal health information is protected. 5. Our team would provide follow up care in person if/when the patient needs it. Patient's location: home address in [facility]  Physician  location home address in [facility] other people involved in call  caregiver      On this date 8/13/2021 I have spent 20 minutes reviewing previous notes, test results and face to face (virtual) with the patient discussing the diagnosis and importance of compliance with the treatment plan as well as documenting on the day of the visit. This visit was completed virtually using Doxy. me        Lives with Venkat Jones and working out [employer] with caregiver  0/3 3 point fix  May sleep at night   Sometimes eats repetitive meals like mashed pototos and meatloaf   And will not use door but  Locked for security  Too sleepy during day to function  And question of disease progression or med side effect   Impression: Alzheimer's with Trisomy 21  Will proceed to changes noted   Zyrtec to 5 mg hs  Namenda 5  mg hs   Aricept 15 mg in AM and [facility] called and notified  Caregiver will call on Monday , 3 days with update  Z kristal ordered for blepharitis

## 2021-08-13 NOTE — PROGRESS NOTES
Chief Complaint   Patient presents with    Other     Virtual video visit. Caregiver, Victor Goodell and guardian, Melanee Joshua with pt.  Other     Temp, scanned, as reported by caregiver is 99. Dr Karo Luo notified.

## 2021-10-13 ENCOUNTER — TELEPHONE (OUTPATIENT)
Dept: GERIATRIC MEDICINE | Age: 81
End: 2021-10-13

## 2021-12-07 ENCOUNTER — HOSPITAL ENCOUNTER (OUTPATIENT)
Dept: GENERAL RADIOLOGY | Age: 81
Discharge: HOME | End: 2021-12-09
Payer: MEDICARE

## 2021-12-07 ENCOUNTER — HOSPITAL ENCOUNTER (OUTPATIENT)
Age: 81
Discharge: HOME | End: 2021-12-09
Payer: MEDICARE

## 2021-12-07 DIAGNOSIS — R52 PAIN: ICD-10-CM

## 2021-12-07 PROCEDURE — 73521 X-RAY EXAM HIPS BI 2 VIEWS: CPT

## 2022-01-01 ENCOUNTER — TELEPHONE (OUTPATIENT)
Dept: GERIATRIC MEDICINE | Age: 82
End: 2022-01-01

## 2022-01-01 RX ORDER — DONEPEZIL HYDROCHLORIDE 10 MG/1
TABLET, FILM COATED ORAL
Qty: 62 TABLET | Refills: 0 | OUTPATIENT
Start: 2022-01-01

## 2022-02-07 ENCOUNTER — TELEPHONE (OUTPATIENT)
Dept: GERIATRIC MEDICINE | Age: 82
End: 2022-02-07

## 2022-02-07 NOTE — TELEPHONE ENCOUNTER
Spoke with caregiver and yaritza has changed and since he is getting worse will increase Aricept to 20 mg a day to help visual hallucinations vs floaters , and loss of ADL dressing

## 2022-02-07 NOTE — TELEPHONE ENCOUNTER
Pt care giver called states that she would like to talk to you about a couple of things that pt is doing. She also wanted you to know that pt is the second oldest down syndrome pt in the country.  Please call her to discuss issues

## 2022-02-09 ENCOUNTER — TELEPHONE (OUTPATIENT)
Dept: GERIATRIC MEDICINE | Age: 82
End: 2022-02-09

## 2022-02-09 RX ORDER — DONEPEZIL HYDROCHLORIDE 10 MG/1
20 TABLET, FILM COATED ORAL DAILY
Qty: 60 TABLET | Refills: 3 | Status: SHIPPED
Start: 2022-02-09 | End: 2022-05-02

## 2022-02-09 NOTE — TELEPHONE ENCOUNTER
Per caregiver, [place] -- states she had spoken with Dr Raulito Jackson and Donepezil was supposed to be increased from 15 mg to 20 mg daily, but [facility] never got the new script. Please order.

## 2022-02-18 ENCOUNTER — TELEPHONE (OUTPATIENT)
Dept: GERIATRIC MEDICINE | Age: 82
End: 2022-02-18

## 2022-03-30 NOTE — TELEPHONE ENCOUNTER
Called and spoke to Yahir Pena she said that they went another route for the info they needed.  She said to thank  For writing the letter but they no longer need it

## 2022-05-02 RX ORDER — DONEPEZIL HYDROCHLORIDE 10 MG/1
TABLET, FILM COATED ORAL
Qty: 62 TABLET | Refills: 5 | Status: SHIPPED | OUTPATIENT
Start: 2022-05-02

## 2022-06-15 RX ORDER — MEMANTINE HYDROCHLORIDE 5 MG/1
TABLET ORAL
Qty: 93 TABLET | Refills: 0 | Status: SHIPPED
Start: 2022-06-15 | End: 2022-09-09

## 2022-08-26 ENCOUNTER — TELEPHONE (OUTPATIENT)
Dept: GERIATRIC MEDICINE | Age: 82
End: 2022-08-26

## 2022-08-26 NOTE — TELEPHONE ENCOUNTER
As [address] called with update -- pt developed pneumonia in August.  Still lives with caregiver [place] but family decided to get Hospice involved. Not yet ready for end of life care, but on board to handle needs as they arise. Now under the care of [facility]. Juana Leblanc thanks Dr Mohan Forte for all the care he has given Fernandez Dye -- very much appreciated.

## 2022-09-09 RX ORDER — MEMANTINE HYDROCHLORIDE 5 MG/1
TABLET ORAL
Qty: 93 TABLET | Refills: 0 | Status: SHIPPED | OUTPATIENT
Start: 2022-09-09

## 2022-11-18 ENCOUNTER — TELEPHONE (OUTPATIENT)
Dept: GERIATRIC MEDICINE | Age: 82
End: 2022-11-18

## 2022-11-18 NOTE — TELEPHONE ENCOUNTER
Had rcvd a RF request through [facility] from [place] for Donepezil. Per chart note of 8/26/22, [facility], [facility] had taken over care of the pt & was ordering all meds. Spoke with Emery Garvin at [facility] and advised her of above. She will contact Hospice for refill and call us back only if needed.

## 2022-11-30 NOTE — TELEPHONE ENCOUNTER
Varsha Maldonado called to notify us that Gertrudis Dye passed away peacefully this morning. Was under the care of [facility] [place]. Very appreciative of all the care provided by Dr Dava Siemens & staff.